# Patient Record
Sex: MALE | Race: WHITE | NOT HISPANIC OR LATINO | Employment: FULL TIME | URBAN - METROPOLITAN AREA
[De-identification: names, ages, dates, MRNs, and addresses within clinical notes are randomized per-mention and may not be internally consistent; named-entity substitution may affect disease eponyms.]

---

## 2022-09-01 ENCOUNTER — OFFICE VISIT (OUTPATIENT)
Dept: FAMILY MEDICINE CLINIC | Facility: CLINIC | Age: 27
End: 2022-09-01

## 2022-09-01 ENCOUNTER — HOSPITAL ENCOUNTER (OUTPATIENT)
Dept: GENERAL RADIOLOGY | Facility: HOSPITAL | Age: 27
Discharge: HOME | End: 2022-09-01
Admitting: NURSE PRACTITIONER

## 2022-09-01 VITALS
HEIGHT: 72 IN | TEMPERATURE: 96.8 F | WEIGHT: 279.9 LBS | SYSTOLIC BLOOD PRESSURE: 122 MMHG | DIASTOLIC BLOOD PRESSURE: 82 MMHG | OXYGEN SATURATION: 97 % | HEART RATE: 84 BPM | BODY MASS INDEX: 37.91 KG/M2 | RESPIRATION RATE: 18 BRPM

## 2022-09-01 DIAGNOSIS — R10.84 GENERALIZED ABDOMINAL PAIN: ICD-10-CM

## 2022-09-01 DIAGNOSIS — R10.84 GENERALIZED ABDOMINAL PAIN: Primary | ICD-10-CM

## 2022-09-01 DIAGNOSIS — K90.41 GLUTEN INTOLERANCE: ICD-10-CM

## 2022-09-01 PROCEDURE — 74019 RADEX ABDOMEN 2 VIEWS: CPT

## 2022-09-01 PROCEDURE — 90471 IMMUNIZATION ADMIN: CPT | Performed by: NURSE PRACTITIONER

## 2022-09-01 PROCEDURE — 90715 TDAP VACCINE 7 YRS/> IM: CPT | Performed by: NURSE PRACTITIONER

## 2022-09-01 PROCEDURE — 99203 OFFICE O/P NEW LOW 30 MIN: CPT | Performed by: NURSE PRACTITIONER

## 2022-09-01 RX ORDER — CETIRIZINE HYDROCHLORIDE 10 MG/1
10 TABLET ORAL DAILY
COMMUNITY

## 2022-09-01 NOTE — PROGRESS NOTES
"Chief Complaint  Establish Care and Abdominal Pain (Rt side, x 3 wks/Family history gall bladder)    Subjective        Venkatesh Brumfield presents to [facility]  History of Present Illness  HPI    Mr. Brumfield presents as a new patient with right side abdominal pain for the past 3 weeks.    The patient reports he is doing fine. He states he took an at home reaction test fir allergies. He notes he does not believe he has had a tetanus shot in the past 10 years. The patient reports for the last 2 or 3 weeks he has been experiencing pains in his lower stomach. He states he will sometimes get a \"dull sensation,\" and he would rate it a 0.5 out of 10. He notes his bowel movements have been regular. He reports he only gets cramps when he eats gluten. The patient states he had an episode that lasted for 4 hours and was a stabbing pain. He notes that was a 7 or 8 out of 10 on a scale. He reports he took tums and it did not help. He states seasonal allergy to POLLEN.He has a  family history of gallbladder disease.    Allergies  GLUTEN INTOLERANCE  BLACK TEA  CHICKEN ALLERGY  EGGS  RYE      Objective   Vital Signs:  /82 (BP Location: Left arm, Patient Position: Sitting, Cuff Size: Adult)   Pulse 84   Temp 96.8 °F (36 °C) (Temporal)   Resp 18   Ht 182.9 cm (72\")   Wt 127 kg (279 lb 14.4 oz)   SpO2 97%   BMI 37.96 kg/m²   Estimated body mass index is 37.96 kg/m² as calculated from the following:    Height as of this encounter: 182.9 cm (72\").    Weight as of this encounter: 127 kg (279 lb 14.4 oz).          Physical Exam  Vitals reviewed.   Constitutional:       General: He is not in acute distress.     Appearance: He is well-developed. He is not diaphoretic.   HENT:      Head: Normocephalic and atraumatic.      Right Ear: Tympanic membrane, ear canal and external ear normal.      Left Ear: Tympanic membrane, ear canal and external ear normal.      Nose: Nose normal.      " Mouth/Throat:      Pharynx: Uvula midline. No oropharyngeal exudate.   Cardiovascular:      Rate and Rhythm: Normal rate and regular rhythm.      Heart sounds: Normal heart sounds. No murmur heard.    No friction rub. No gallop.   Pulmonary:      Effort: Pulmonary effort is normal. No respiratory distress.      Breath sounds: Normal breath sounds. No wheezing or rales.   Abdominal:      General: Bowel sounds are normal. There is no distension.      Palpations: Abdomen is soft.      Tenderness: There is no abdominal tenderness.   Musculoskeletal:      Cervical back: Neck supple.   Skin:     General: Skin is warm and dry.   Neurological:      Mental Status: He is alert and oriented to person, place, and time.        Result Review :  DAXRESULTS                Assessment and Plan   Diagnoses and all orders for this visit:    1. Gluten intolerance (Primary)  -     Tissue Transglutaminase, IgA    2. Generalized abdominal pain  -     CBC & Differential  -     Comprehensive metabolic panel  -     XR abdomen flat and upright; Future    Other orders  -     Tdap Vaccine Greater Than or Equal To 6yo IM      DAXPLAN    Generalized abdominal pain   We will obtain abdominal flattened upright today. Suspect potential constipation. 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 We will go ahead and proceed with a CBC, CMP, and transglutaminase. He does have celiac intolerance. He did find food sensitivities recently with an at home test. This may be contributing to his belly pain. He has no tenderness in the right upper quadrant. He is concerned with family history of gallbladder disease. If it continues, we can evaluate need for ultrasound at that time. We are going to update his tetanus, Tdap today. He's up to date on his Covid vaccine. The labs as discussed and follow up as needed.         Follow Up   No follow-ups on file.  Patient was given instructions  and counseling regarding his condition or for health maintenance advice. Please see specific information pulled into the AVS if appropriate.       Transcribed from ambient dictation for CLINTON Abdi by Erica Bills.  09/01/22   14:42 EDT    Patient verbalized consent to the visit recording.  I have personally performed the services described in this document as transcribed by the above individual, and it is both accurate and complete.  CLINTON Abdi  9/6/2022  17:54 EDT

## 2022-09-02 LAB
ALBUMIN SERPL-MCNC: 5.2 G/DL (ref 3.5–5.2)
ALBUMIN/GLOB SERPL: 3.3 G/DL
ALP SERPL-CCNC: 84 U/L (ref 39–117)
ALT SERPL-CCNC: 21 U/L (ref 1–41)
AST SERPL-CCNC: 16 U/L (ref 1–40)
BASOPHILS # BLD AUTO: 0.03 10*3/MM3 (ref 0–0.2)
BASOPHILS NFR BLD AUTO: 0.6 % (ref 0–1.5)
BILIRUB SERPL-MCNC: 0.4 MG/DL (ref 0–1.2)
BUN SERPL-MCNC: 11 MG/DL (ref 6–20)
BUN/CREAT SERPL: 14.5 (ref 7–25)
CALCIUM SERPL-MCNC: 8.9 MG/DL (ref 8.6–10.5)
CHLORIDE SERPL-SCNC: 105 MMOL/L (ref 98–107)
CO2 SERPL-SCNC: 26 MMOL/L (ref 22–29)
CREAT SERPL-MCNC: 0.76 MG/DL (ref 0.76–1.27)
EGFRCR-CYS SERPLBLD CKD-EPI 2021: 124.8 ML/MIN/1.73
EOSINOPHIL # BLD AUTO: 0.06 10*3/MM3 (ref 0–0.4)
EOSINOPHIL NFR BLD AUTO: 1.2 % (ref 0.3–6.2)
ERYTHROCYTE [DISTWIDTH] IN BLOOD BY AUTOMATED COUNT: 12.3 % (ref 12.3–15.4)
GLOBULIN SER CALC-MCNC: 1.6 GM/DL
GLUCOSE SERPL-MCNC: 86 MG/DL (ref 65–99)
HCT VFR BLD AUTO: 44.5 % (ref 37.5–51)
HGB BLD-MCNC: 14.8 G/DL (ref 13–17.7)
IMM GRANULOCYTES # BLD AUTO: 0.02 10*3/MM3 (ref 0–0.05)
IMM GRANULOCYTES NFR BLD AUTO: 0.4 % (ref 0–0.5)
LYMPHOCYTES # BLD AUTO: 1.67 10*3/MM3 (ref 0.7–3.1)
LYMPHOCYTES NFR BLD AUTO: 33.1 % (ref 19.6–45.3)
MCH RBC QN AUTO: 29.6 PG (ref 26.6–33)
MCHC RBC AUTO-ENTMCNC: 33.3 G/DL (ref 31.5–35.7)
MCV RBC AUTO: 89 FL (ref 79–97)
MONOCYTES # BLD AUTO: 0.33 10*3/MM3 (ref 0.1–0.9)
MONOCYTES NFR BLD AUTO: 6.5 % (ref 5–12)
NEUTROPHILS # BLD AUTO: 2.93 10*3/MM3 (ref 1.7–7)
NEUTROPHILS NFR BLD AUTO: 58.2 % (ref 42.7–76)
NRBC BLD AUTO-RTO: 0 /100 WBC (ref 0–0.2)
PLATELET # BLD AUTO: 219 10*3/MM3 (ref 140–450)
POTASSIUM SERPL-SCNC: 4 MMOL/L (ref 3.5–5.2)
PROT SERPL-MCNC: 6.8 G/DL (ref 6–8.5)
RBC # BLD AUTO: 5 10*6/MM3 (ref 4.14–5.8)
SODIUM SERPL-SCNC: 142 MMOL/L (ref 136–145)
TTG IGA SER-ACNC: <2 U/ML (ref 0–3)
WBC # BLD AUTO: 5.04 10*3/MM3 (ref 3.4–10.8)

## 2022-09-23 ENCOUNTER — LAB (OUTPATIENT)
Dept: LAB | Facility: HOSPITAL | Age: 27
End: 2022-09-23

## 2022-09-23 ENCOUNTER — OFFICE VISIT (OUTPATIENT)
Dept: FAMILY MEDICINE CLINIC | Facility: CLINIC | Age: 27
End: 2022-09-23

## 2022-09-23 VITALS
HEART RATE: 83 BPM | OXYGEN SATURATION: 99 % | RESPIRATION RATE: 18 BRPM | SYSTOLIC BLOOD PRESSURE: 144 MMHG | WEIGHT: 278.1 LBS | TEMPERATURE: 96.9 F | DIASTOLIC BLOOD PRESSURE: 82 MMHG | BODY MASS INDEX: 37.67 KG/M2 | HEIGHT: 72 IN

## 2022-09-23 DIAGNOSIS — R10.31 RIGHT LOWER QUADRANT PAIN: Primary | ICD-10-CM

## 2022-09-23 DIAGNOSIS — R19.8 ABDOMINAL GUARDING: ICD-10-CM

## 2022-09-23 LAB
ALBUMIN SERPL-MCNC: 5.1 G/DL (ref 3.5–5.2)
ALBUMIN/GLOB SERPL: 2.1 G/DL
ALP SERPL-CCNC: 82 U/L (ref 39–117)
ALT SERPL W P-5'-P-CCNC: 23 U/L (ref 1–41)
ANION GAP SERPL CALCULATED.3IONS-SCNC: 11 MMOL/L (ref 5–15)
AST SERPL-CCNC: 18 U/L (ref 1–40)
BASOPHILS # BLD AUTO: 0.03 10*3/MM3 (ref 0–0.2)
BASOPHILS NFR BLD AUTO: 0.4 % (ref 0–1.5)
BILIRUB SERPL-MCNC: 0.4 MG/DL (ref 0–1.2)
BUN SERPL-MCNC: 11 MG/DL (ref 6–20)
BUN/CREAT SERPL: 12.2 (ref 7–25)
CALCIUM SPEC-SCNC: 9.7 MG/DL (ref 8.6–10.5)
CHLORIDE SERPL-SCNC: 103 MMOL/L (ref 98–107)
CO2 SERPL-SCNC: 29 MMOL/L (ref 22–29)
CREAT SERPL-MCNC: 0.9 MG/DL (ref 0.76–1.27)
DEPRECATED RDW RBC AUTO: 38 FL (ref 37–54)
EGFRCR SERPLBLD CKD-EPI 2021: 118.6 ML/MIN/1.73
EOSINOPHIL # BLD AUTO: 0.11 10*3/MM3 (ref 0–0.4)
EOSINOPHIL NFR BLD AUTO: 1.6 % (ref 0.3–6.2)
ERYTHROCYTE [DISTWIDTH] IN BLOOD BY AUTOMATED COUNT: 11.8 % (ref 12.3–15.4)
GLOBULIN UR ELPH-MCNC: 2.4 GM/DL
GLUCOSE SERPL-MCNC: 93 MG/DL (ref 65–99)
HCT VFR BLD AUTO: 44.6 % (ref 37.5–51)
HGB BLD-MCNC: 15.4 G/DL (ref 13–17.7)
IMM GRANULOCYTES # BLD AUTO: 0.01 10*3/MM3 (ref 0–0.05)
IMM GRANULOCYTES NFR BLD AUTO: 0.1 % (ref 0–0.5)
LYMPHOCYTES # BLD AUTO: 1.42 10*3/MM3 (ref 0.7–3.1)
LYMPHOCYTES NFR BLD AUTO: 21.3 % (ref 19.6–45.3)
MCH RBC QN AUTO: 30.6 PG (ref 26.6–33)
MCHC RBC AUTO-ENTMCNC: 34.5 G/DL (ref 31.5–35.7)
MCV RBC AUTO: 88.7 FL (ref 79–97)
MONOCYTES # BLD AUTO: 0.38 10*3/MM3 (ref 0.1–0.9)
MONOCYTES NFR BLD AUTO: 5.7 % (ref 5–12)
NEUTROPHILS NFR BLD AUTO: 4.73 10*3/MM3 (ref 1.7–7)
NEUTROPHILS NFR BLD AUTO: 70.9 % (ref 42.7–76)
NRBC BLD AUTO-RTO: 0 /100 WBC (ref 0–0.2)
PLATELET # BLD AUTO: 226 10*3/MM3 (ref 140–450)
PMV BLD AUTO: 10.4 FL (ref 6–12)
POTASSIUM SERPL-SCNC: 4.1 MMOL/L (ref 3.5–5.2)
PROT SERPL-MCNC: 7.5 G/DL (ref 6–8.5)
RBC # BLD AUTO: 5.03 10*6/MM3 (ref 4.14–5.8)
SODIUM SERPL-SCNC: 143 MMOL/L (ref 136–145)
WBC NRBC COR # BLD: 6.68 10*3/MM3 (ref 3.4–10.8)

## 2022-09-23 PROCEDURE — 80053 COMPREHEN METABOLIC PANEL: CPT | Performed by: NURSE PRACTITIONER

## 2022-09-23 PROCEDURE — 85025 COMPLETE CBC W/AUTO DIFF WBC: CPT | Performed by: NURSE PRACTITIONER

## 2022-09-23 PROCEDURE — 99214 OFFICE O/P EST MOD 30 MIN: CPT | Performed by: NURSE PRACTITIONER

## 2022-09-23 PROCEDURE — 36415 COLL VENOUS BLD VENIPUNCTURE: CPT | Performed by: NURSE PRACTITIONER

## 2022-09-23 NOTE — PROGRESS NOTES
"Chief Complaint  Abdominal Pain (X 5 days)    Subjective        Venkatesh Brumfield presents to [facility]  History of Present Illness  Acute right lower abdominal pain. Persistant. States improves if he sits down and relaxes. Missed two days of work this week. Pain with standing or sitting. Feels like a tightness in muscles. Denies fever or chills. Feels irritated related to pain, trouble sleeping. Pain is currently 1-2, started mild. Worse with movement. States when driving causes to tense. Denies urinary symptoms. Denies n/v/d/c.     Hx of gluten intolerance, states this is different.  Abdominal Pain        Objective   Vital Signs:  /82 (BP Location: Left arm, Patient Position: Sitting, Cuff Size: Adult)   Pulse 83   Temp 96.9 °F (36.1 °C) (Temporal)   Resp 18   Ht 182.9 cm (72.01\")   Wt 126 kg (278 lb 1.6 oz)   SpO2 99%   BMI 37.71 kg/m²   Estimated body mass index is 37.71 kg/m² as calculated from the following:    Height as of this encounter: 182.9 cm (72.01\").    Weight as of this encounter: 126 kg (278 lb 1.6 oz).          Physical Exam  Vitals reviewed.   Constitutional:       Appearance: Normal appearance.   Cardiovascular:      Rate and Rhythm: Normal rate and regular rhythm.      Heart sounds: No murmur heard.    No friction rub. No gallop.   Pulmonary:      Effort: Pulmonary effort is normal. No respiratory distress.      Breath sounds: Normal breath sounds. No wheezing, rhonchi or rales.   Abdominal:      General: Bowel sounds are normal. There is no distension.      Palpations: Abdomen is soft. There is no mass.      Tenderness: There is abdominal tenderness (generalized lower abdominal tenderness).      Hernia: No hernia is present.   Skin:     General: Skin is warm and dry.   Neurological:      Mental Status: He is alert and oriented to person, place, and time.   Psychiatric:         Mood and Affect: Mood normal.        Result Review :    CMP    CMP " 9/1/22 9/23/22 9/25/22   Glucose 86 93 73   BUN 11 11 11   Creatinine 0.76 0.90 0.92   Sodium 142 143 142   Potassium 4.0 4.1 3.2 (A)   Chloride 105 103 100   Calcium 8.9 9.7 9.7   Total Protein 6.8     Albumin 5.20 5.10 5.60 (A)   Globulin 1.6     Total Bilirubin 0.4 0.4 0.7   Alkaline Phosphatase 84 82 100   AST (SGOT) 16 18 23   ALT (SGPT) 21 23 31   (A) Abnormal value       Comments are available for some flowsheets but are not being displayed.           CBC    CBC 9/1/22 9/23/22 9/25/22   WBC 5.04 6.68 8.59   RBC 5.00 5.03 5.53   Hemoglobin 14.8 15.4 16.5   Hematocrit 44.5 44.6 47.6   MCV 89.0 88.7 86.1   MCH 29.6 30.6 29.8   MCHC 33.3 34.5 34.7   RDW 12.3 11.8 (A) 11.8 (A)   Platelets 219 226 221   (A) Abnormal value                      Assessment and Plan   Diagnoses and all orders for this visit:    1. Right lower quadrant pain (Primary)  -     CT Abdomen Pelvis With Contrast  -     CBC & Differential  -     Comprehensive metabolic panel    2. Abdominal guarding  -     CT Abdomen Pelvis With Contrast  -     CBC & Differential  -     Comprehensive metabolic panel    labs today to ensure labs are normal  Unable to get CT scan today, unable to obtain PA, advised patient for worsening symptoms to proceed to ER for further evaluation  His labs are stable which is reassuring, although does have hypokalemia, will need to increase potassium in diet         Follow Up   No follow-ups on file.  Patient was given instructions and counseling regarding his condition or for health maintenance advice. Please see specific information pulled into the AVS if appropriate.

## 2022-09-25 ENCOUNTER — HOSPITAL ENCOUNTER (EMERGENCY)
Facility: HOSPITAL | Age: 27
Discharge: HOME | End: 2022-09-25
Attending: EMERGENCY MEDICINE | Admitting: EMERGENCY MEDICINE

## 2022-09-25 ENCOUNTER — APPOINTMENT (OUTPATIENT)
Dept: CT IMAGING | Facility: HOSPITAL | Age: 27
End: 2022-09-25

## 2022-09-25 VITALS
OXYGEN SATURATION: 99 % | TEMPERATURE: 98.3 F | SYSTOLIC BLOOD PRESSURE: 139 MMHG | WEIGHT: 278 LBS | BODY MASS INDEX: 37.65 KG/M2 | HEART RATE: 64 BPM | HEIGHT: 72 IN | DIASTOLIC BLOOD PRESSURE: 102 MMHG | RESPIRATION RATE: 18 BRPM

## 2022-09-25 DIAGNOSIS — R10.31 RIGHT LOWER QUADRANT ABDOMINAL PAIN: Primary | ICD-10-CM

## 2022-09-25 DIAGNOSIS — E87.6 HYPOKALEMIA: ICD-10-CM

## 2022-09-25 DIAGNOSIS — K76.0 FATTY LIVER: ICD-10-CM

## 2022-09-25 LAB
ALBUMIN SERPL-MCNC: 5.6 G/DL (ref 3.5–5.2)
ALBUMIN/GLOB SERPL: 1.9 G/DL
ALP SERPL-CCNC: 100 U/L (ref 39–117)
ALT SERPL W P-5'-P-CCNC: 31 U/L (ref 1–41)
ANION GAP SERPL CALCULATED.3IONS-SCNC: 14.6 MMOL/L (ref 5–15)
AST SERPL-CCNC: 23 U/L (ref 1–40)
BASOPHILS # BLD AUTO: 0.04 10*3/MM3 (ref 0–0.2)
BASOPHILS NFR BLD AUTO: 0.5 % (ref 0–1.5)
BILIRUB SERPL-MCNC: 0.7 MG/DL (ref 0–1.2)
BILIRUB UR QL STRIP: NEGATIVE
BUN SERPL-MCNC: 11 MG/DL (ref 6–20)
BUN/CREAT SERPL: 12 (ref 7–25)
CALCIUM SPEC-SCNC: 9.7 MG/DL (ref 8.6–10.5)
CHLORIDE SERPL-SCNC: 100 MMOL/L (ref 98–107)
CLARITY UR: ABNORMAL
CO2 SERPL-SCNC: 27.4 MMOL/L (ref 22–29)
COLOR UR: YELLOW
CREAT SERPL-MCNC: 0.92 MG/DL (ref 0.76–1.27)
DEPRECATED RDW RBC AUTO: 37.7 FL (ref 37–54)
EGFRCR SERPLBLD CKD-EPI 2021: 115.5 ML/MIN/1.73
EOSINOPHIL # BLD AUTO: 0.05 10*3/MM3 (ref 0–0.4)
EOSINOPHIL NFR BLD AUTO: 0.6 % (ref 0.3–6.2)
ERYTHROCYTE [DISTWIDTH] IN BLOOD BY AUTOMATED COUNT: 11.8 % (ref 12.3–15.4)
GLOBULIN UR ELPH-MCNC: 3 GM/DL
GLUCOSE SERPL-MCNC: 73 MG/DL (ref 65–99)
GLUCOSE UR STRIP-MCNC: NEGATIVE MG/DL
HCT VFR BLD AUTO: 47.6 % (ref 37.5–51)
HGB BLD-MCNC: 16.5 G/DL (ref 13–17.7)
HGB UR QL STRIP.AUTO: NEGATIVE
IMM GRANULOCYTES # BLD AUTO: 0.02 10*3/MM3 (ref 0–0.05)
IMM GRANULOCYTES NFR BLD AUTO: 0.2 % (ref 0–0.5)
INR PPP: 1.1 (ref 0.9–1.1)
KETONES UR QL STRIP: ABNORMAL
LEUKOCYTE ESTERASE UR QL STRIP.AUTO: NEGATIVE
LIPASE SERPL-CCNC: 18 U/L (ref 13–60)
LYMPHOCYTES # BLD AUTO: 2.32 10*3/MM3 (ref 0.7–3.1)
LYMPHOCYTES NFR BLD AUTO: 27 % (ref 19.6–45.3)
MAGNESIUM SERPL-MCNC: 1.9 MG/DL (ref 1.6–2.6)
MCH RBC QN AUTO: 29.8 PG (ref 26.6–33)
MCHC RBC AUTO-ENTMCNC: 34.7 G/DL (ref 31.5–35.7)
MCV RBC AUTO: 86.1 FL (ref 79–97)
MONOCYTES # BLD AUTO: 0.46 10*3/MM3 (ref 0.1–0.9)
MONOCYTES NFR BLD AUTO: 5.4 % (ref 5–12)
NEUTROPHILS NFR BLD AUTO: 5.7 10*3/MM3 (ref 1.7–7)
NEUTROPHILS NFR BLD AUTO: 66.3 % (ref 42.7–76)
NITRITE UR QL STRIP: NEGATIVE
NRBC BLD AUTO-RTO: 0 /100 WBC (ref 0–0.2)
PH UR STRIP.AUTO: 6.5 [PH] (ref 5–8)
PLATELET # BLD AUTO: 221 10*3/MM3 (ref 140–450)
PMV BLD AUTO: 11.1 FL (ref 6–12)
POTASSIUM SERPL-SCNC: 3.2 MMOL/L (ref 3.5–5.2)
PROT SERPL-MCNC: 8.6 G/DL (ref 6–8.5)
PROT UR QL STRIP: NEGATIVE
PROTHROMBIN TIME: 14.1 SECONDS (ref 11.7–14.2)
RBC # BLD AUTO: 5.53 10*6/MM3 (ref 4.14–5.8)
SODIUM SERPL-SCNC: 142 MMOL/L (ref 136–145)
SP GR UR STRIP: 1.02 (ref 1–1.03)
UROBILINOGEN UR QL STRIP: ABNORMAL
WBC NRBC COR # BLD: 8.59 10*3/MM3 (ref 3.4–10.8)

## 2022-09-25 PROCEDURE — 25010000002 ONDANSETRON PER 1 MG: Performed by: EMERGENCY MEDICINE

## 2022-09-25 PROCEDURE — 80053 COMPREHEN METABOLIC PANEL: CPT | Performed by: EMERGENCY MEDICINE

## 2022-09-25 PROCEDURE — 83690 ASSAY OF LIPASE: CPT | Performed by: EMERGENCY MEDICINE

## 2022-09-25 PROCEDURE — 85025 COMPLETE CBC W/AUTO DIFF WBC: CPT | Performed by: EMERGENCY MEDICINE

## 2022-09-25 PROCEDURE — 83735 ASSAY OF MAGNESIUM: CPT | Performed by: EMERGENCY MEDICINE

## 2022-09-25 PROCEDURE — 96361 HYDRATE IV INFUSION ADD-ON: CPT

## 2022-09-25 PROCEDURE — 74177 CT ABD & PELVIS W/CONTRAST: CPT

## 2022-09-25 PROCEDURE — 85610 PROTHROMBIN TIME: CPT | Performed by: EMERGENCY MEDICINE

## 2022-09-25 PROCEDURE — 25010000002 IOPAMIDOL 61 % SOLUTION: Performed by: EMERGENCY MEDICINE

## 2022-09-25 PROCEDURE — 36415 COLL VENOUS BLD VENIPUNCTURE: CPT

## 2022-09-25 PROCEDURE — 99283 EMERGENCY DEPT VISIT LOW MDM: CPT

## 2022-09-25 PROCEDURE — 96374 THER/PROPH/DIAG INJ IV PUSH: CPT

## 2022-09-25 PROCEDURE — 81003 URINALYSIS AUTO W/O SCOPE: CPT | Performed by: EMERGENCY MEDICINE

## 2022-09-25 RX ORDER — SODIUM CHLORIDE 9 MG/ML
125 INJECTION, SOLUTION INTRAVENOUS CONTINUOUS
Status: DISCONTINUED | OUTPATIENT
Start: 2022-09-25 | End: 2022-09-25 | Stop reason: HOSPADM

## 2022-09-25 RX ORDER — ALUMINA, MAGNESIA, AND SIMETHICONE 2400; 2400; 240 MG/30ML; MG/30ML; MG/30ML
15 SUSPENSION ORAL ONCE
Status: COMPLETED | OUTPATIENT
Start: 2022-09-25 | End: 2022-09-25

## 2022-09-25 RX ORDER — ONDANSETRON 2 MG/ML
4 INJECTION INTRAMUSCULAR; INTRAVENOUS ONCE
Status: COMPLETED | OUTPATIENT
Start: 2022-09-25 | End: 2022-09-25

## 2022-09-25 RX ORDER — SODIUM CHLORIDE 0.9 % (FLUSH) 0.9 %
10 SYRINGE (ML) INJECTION AS NEEDED
Status: DISCONTINUED | OUTPATIENT
Start: 2022-09-25 | End: 2022-09-25 | Stop reason: HOSPADM

## 2022-09-25 RX ORDER — POTASSIUM CHLORIDE 750 MG/1
40 TABLET, FILM COATED, EXTENDED RELEASE ORAL ONCE
Status: COMPLETED | OUTPATIENT
Start: 2022-09-25 | End: 2022-09-25

## 2022-09-25 RX ORDER — ALUMINA, MAGNESIA, AND SIMETHICONE 2400; 2400; 240 MG/30ML; MG/30ML; MG/30ML
15 SUSPENSION ORAL ONCE
Status: DISCONTINUED | OUTPATIENT
Start: 2022-09-25 | End: 2022-09-25

## 2022-09-25 RX ADMIN — SODIUM CHLORIDE 125 ML/HR: 9 INJECTION, SOLUTION INTRAVENOUS at 18:09

## 2022-09-25 RX ADMIN — SODIUM CHLORIDE 1000 ML: 9 INJECTION, SOLUTION INTRAVENOUS at 17:02

## 2022-09-25 RX ADMIN — ALUMINUM HYDROXIDE, MAGNESIUM HYDROXIDE, AND DIMETHICONE 15 ML: 400; 400; 40 SUSPENSION ORAL at 18:26

## 2022-09-25 RX ADMIN — IOPAMIDOL 85 ML: 612 INJECTION, SOLUTION INTRAVENOUS at 18:49

## 2022-09-25 RX ADMIN — POTASSIUM CHLORIDE 40 MEQ: 750 TABLET, EXTENDED RELEASE ORAL at 19:46

## 2022-09-25 RX ADMIN — ONDANSETRON 4 MG: 2 INJECTION INTRAMUSCULAR; INTRAVENOUS at 18:26

## 2022-09-25 NOTE — ED PROVIDER NOTES
" EMERGENCY DEPARTMENT ENCOUNTER    Room Number:  40/40  Date of encounter:  9/25/2022  PCP: Carlie Claros APRN  Historian: Patient and mother      HPI:  Chief Complaint: \"I am having abdominal issues\"  A complete HPI/ROS/PMH/PSH/SH/FH are unobtainable due to: Not applicable  Context: Venkatesh Brumfield is a 27 y.o. male who presents to the ED c/o initially had some symptoms that started at the end of August and saw his primary care physician and had x-rays of the abdomen which were okay.  Had some improvement and then over the past week and a half started worsening of his abdominal issues.  He does not want to say that it started off his pain started off more as an uneasiness and may be a mild discomfort.  It then gradually became more of a painful sensation.  He describes the pain as a muscle tightness and sometimes sharp pain.  This again has been going on for about 1 week it is been constant but will wax and wane in severity.  Is located in his right lower quadrant.  He believes that it is worse after standing and walking, bending, twisting.  He is not eating or drinking as much but does not feel that the pain is worse with eating or drinking.  He had 1 episode of diarrhea last night.  It was a large amount of watery stool.  There was no blood in the diarrhea.  He has had bouts of nausea but he has not had any vomiting.  He has had no fevers or chills, chest pain, shortness of breath.  He has never had surgery on his abdomen.  He does have a history of gluten intolerance and history of episodic abdominal pain in the past.  He denies any dysuria or urinary frequency.  Denies any pain or swelling to his genitourinary system and no testicular pain or swelling.        Previous Episodes: Has had abdominal pain in the past.  This is more in the right lower quadrant and is not certain if this is exactly the same as what he is experienced in the past.  Current Symptoms: See above    MEDICAL HISTORY " REVIEWED  I can see that the patient was seen on September 23 at the office for right lower quadrant abdominal pain.  I also see that the patient was seen for abdominal pain and had x-rays on September 1, 2022.  On September 1 she was also seen by his primary care physician Dr. Claros.  I can see in looking at records over the past several years patient has had abdominal pain and there is mention of having chronic generalized abdominal pain.  Patient had x-rays on September 1 and I reviewed the report.  The x-rays were unremarkable and read by radiology.      PAST MEDICAL HISTORY  Active Ambulatory Problems     Diagnosis Date Noted   • No Active Ambulatory Problems     Resolved Ambulatory Problems     Diagnosis Date Noted   • No Resolved Ambulatory Problems     Past Medical History:   Diagnosis Date   • Ear infection          PAST SURGICAL HISTORY  Past Surgical History:   Procedure Laterality Date   • MANDIBLE SURGERY     • TYMPANOSTOMY           FAMILY HISTORY  Family History   Problem Relation Age of Onset   • Lumbar disc disease Mother    • Hypertension Mother    • Alcohol abuse Father    • Diabetes Father    • Esophageal varices Father    • Bipolar disorder Sister    • Diabetes Maternal Grandmother    • Diabetes Maternal Grandfather          SOCIAL HISTORY  Social History     Socioeconomic History   • Marital status: Single   Tobacco Use   • Smoking status: Never Smoker   • Smokeless tobacco: Never Used   Vaping Use   • Vaping Use: Every day   • Substances: CBD   Substance and Sexual Activity   • Alcohol use: Yes     Alcohol/week: 2.0 standard drinks     Types: 2 Shots of liquor per week     Comment: occasionally   • Drug use: Yes     Types: Marijuana     Comment: occ   • Sexual activity: Not Currently         ALLERGIES  Gluten meal, Black tea, Chicken allergy, Eggs or egg-derived products, and Rye        REVIEW OF SYSTEMS  Review of Systems     All systems reviewed and negative except for those discussed in  HPI.       PHYSICAL EXAM    I have reviewed the triage vital signs and nursing notes.    ED Triage Vitals [09/25/22 1550]   Temp Heart Rate Resp BP SpO2   98.3 °F (36.8 °C) 67 16 -- 98 %      Temp src Heart Rate Source Patient Position BP Location FiO2 (%)   Tympanic Monitor -- -- --       GENERAL: Male no acute distress.Vital signs on my initial evaluation unremarkable  HENT: nares patent  Head/neck/ face are symmetric without gross deformity, signs of trauma, or swelling  EYES: no scleral icterus, no conjunctival pallor.  NECK: Supple, no meningismus  CV: regular rhythm, regular rate with intact distal pulses.  RESPIRATORY: normal effort and no respiratory distress.  ABDOMEN: soft and obese.  Location of pain is focal in the right lower quadrant and suprapubic region.  Normal bowel sounds and there is no guarding.  Back exam is unremarkable and he does not have any CVA tenderness.  MUSCULOSKELETAL: no deformity.  Intact distal pulses to upper and lower extremities are equal strong and symmetric  NEURO: alert and appropriate, moves all extremities, follows commands.  No focal motor or sensory changes  SKIN: warm, dry    Vital signs and nursing notes reviewed.        LAB RESULTS  Recent Results (from the past 24 hour(s))   Comprehensive Metabolic Panel    Collection Time: 09/25/22  4:52 PM    Specimen: Blood   Result Value Ref Range    Glucose 73 65 - 99 mg/dL    BUN 11 6 - 20 mg/dL    Creatinine 0.92 0.76 - 1.27 mg/dL    Sodium 142 136 - 145 mmol/L    Potassium 3.2 (L) 3.5 - 5.2 mmol/L    Chloride 100 98 - 107 mmol/L    CO2 27.4 22.0 - 29.0 mmol/L    Calcium 9.7 8.6 - 10.5 mg/dL    Total Protein 8.6 (H) 6.0 - 8.5 g/dL    Albumin 5.60 (H) 3.50 - 5.20 g/dL    ALT (SGPT) 31 1 - 41 U/L    AST (SGOT) 23 1 - 40 U/L    Alkaline Phosphatase 100 39 - 117 U/L    Total Bilirubin 0.7 0.0 - 1.2 mg/dL    Globulin 3.0 gm/dL    A/G Ratio 1.9 g/dL    BUN/Creatinine Ratio 12.0 7.0 - 25.0    Anion Gap 14.6 5.0 - 15.0 mmol/L    eGFR  115.5 >60.0 mL/min/1.73   Protime-INR    Collection Time: 09/25/22  4:52 PM    Specimen: Blood   Result Value Ref Range    Protime 14.1 11.7 - 14.2 Seconds    INR 1.10 0.90 - 1.10   Lipase    Collection Time: 09/25/22  4:52 PM    Specimen: Blood   Result Value Ref Range    Lipase 18 13 - 60 U/L   Magnesium    Collection Time: 09/25/22  4:52 PM    Specimen: Blood   Result Value Ref Range    Magnesium 1.9 1.6 - 2.6 mg/dL   CBC Auto Differential    Collection Time: 09/25/22  4:52 PM    Specimen: Blood   Result Value Ref Range    WBC 8.59 3.40 - 10.80 10*3/mm3    RBC 5.53 4.14 - 5.80 10*6/mm3    Hemoglobin 16.5 13.0 - 17.7 g/dL    Hematocrit 47.6 37.5 - 51.0 %    MCV 86.1 79.0 - 97.0 fL    MCH 29.8 26.6 - 33.0 pg    MCHC 34.7 31.5 - 35.7 g/dL    RDW 11.8 (L) 12.3 - 15.4 %    RDW-SD 37.7 37.0 - 54.0 fl    MPV 11.1 6.0 - 12.0 fL    Platelets 221 140 - 450 10*3/mm3    Neutrophil % 66.3 42.7 - 76.0 %    Lymphocyte % 27.0 19.6 - 45.3 %    Monocyte % 5.4 5.0 - 12.0 %    Eosinophil % 0.6 0.3 - 6.2 %    Basophil % 0.5 0.0 - 1.5 %    Immature Grans % 0.2 0.0 - 0.5 %    Neutrophils, Absolute 5.70 1.70 - 7.00 10*3/mm3    Lymphocytes, Absolute 2.32 0.70 - 3.10 10*3/mm3    Monocytes, Absolute 0.46 0.10 - 0.90 10*3/mm3    Eosinophils, Absolute 0.05 0.00 - 0.40 10*3/mm3    Basophils, Absolute 0.04 0.00 - 0.20 10*3/mm3    Immature Grans, Absolute 0.02 0.00 - 0.05 10*3/mm3    nRBC 0.0 0.0 - 0.2 /100 WBC   Urinalysis With Microscopic If Indicated (No Culture) - Urine, Clean Catch    Collection Time: 09/25/22  5:00 PM    Specimen: Urine, Clean Catch   Result Value Ref Range    Color, UA Yellow Yellow, Straw    Appearance, UA Cloudy (A) Clear    pH, UA 6.5 5.0 - 8.0    Specific Gravity, UA 1.023 1.005 - 1.030    Glucose, UA Negative Negative    Ketones, UA Trace (A) Negative    Bilirubin, UA Negative Negative    Blood, UA Negative Negative    Protein, UA Negative Negative    Leuk Esterase, UA Negative Negative    Nitrite, UA Negative  Negative    Urobilinogen, UA 0.2 E.U./dL 0.2 - 1.0 E.U./dL       Ordered the above labs and independently reviewed the results.        RADIOLOGY  CT Abdomen Pelvis With Contrast    Result Date: 9/25/2022  CT ABDOMEN PELVIS W CONTRAST-  Radiation dose reduction techniques were utilized, including automated exposure control and exposure modulation based on body size.  Clinical: Nominal pain right lower quadrant  COMPARISON 12/6/2004  FINDINGS: Subtle diffuse decrease hepatic attenuation suggests fatty infiltration. No focal liver lesion, the gallbladder is normal, no biliary duct dilatation. The pancreas is satisfactory in appearance and the spleen is normal in size and shape. Both adrenal glands have a normal appearance. Both kidneys are normal. No calculus or obstructive uropathy. Ureters are normal in course and caliber to the urinary bladder. Prostate and seminal vesicles have a normal appearance.  The appendix, colon and small bowel are normal. No induration of the mesentery to suggest an inflammatory process. The stomach is collapsed, contour is within normal limits. No duodenal abnormality seen.  CONCLUSION: Hepatic attenuation suggests fatty infiltration. No acute intra-abdominal abnormality.   This report was finalized on 9/25/2022 7:23 PM by Dr. Gopal Mendez M.D.        I ordered the above noted radiological studies. Reviewed by me and discussed with radiologist.  See dictation for official radiology interpretation.      PROCEDURES    Procedures      MEDICATIONS GIVEN IN ER    Medications   sodium chloride 0.9 % bolus 1,000 mL (0 mL Intravenous Stopped 9/25/22 1955)   ondansetron (ZOFRAN) injection 4 mg (4 mg Intravenous Given 9/25/22 1826)   aluminum-magnesium hydroxide-simethicone (MAALOX MAX) 400-400-40 MG/5ML suspension 15 mL (15 mL Oral Given 9/25/22 1826)   iopamidol (ISOVUE-300) 61 % injection 100 mL (85 mL Intravenous Given 9/25/22 1849)   potassium chloride (K-DUR,KLOR-CON) ER tablet 40 mEq (40  mEq Oral Given 9/25/22 1946)         PROGRESS, DATA ANALYSIS, CONSULTS, AND MEDICAL DECISION MAKING    Differential diagnosis includes   - hepatobiliary pathology such as cholecystitis, cholangitis, and symptomatic cholelithiasis  -PUD  -Mesenteric ischemia  - Pancreatitis  - Dyspepsia  - Small bowel or large bowel obstruction  - Appendicitis  - Diverticulitis  - UTI including pyelonephritis  - Ureteral stone  - Zoster  - Colitis, including infectious and ischemic  - Atypical ACS  I informed the patient as well as the mother in the room of the test that we will order.  He states that he was supposed to get a CT scan of his abdomen pelvis but insurance did not improve the CT scan.  His symptoms are worsening so he decided to come here to the emergency department.  He does not want anything for pain at this time.  I explained to the patient and the mother the test that we will order and we will check a CT scan of his abdomen pelvis.  All questions answered.    We are currently under a pandemic from the COVID19 infection.  The patient presented to the emergency department by ambulance or personal vehicle. I followed the current protocols required by Infection Control at [facility] in my evaluation and treatment of the patient. The patient was wearing a face mask during my evaluation and throughout my encounter. During my whole encounter with this patient I used appropriate personal protective equipment.  This equipment consisted of eye protection, facemask, gown, and gloves.  I applied this equipment before entering the room.      All labs have been independently reviewed by me.  All radiology studies have been reviewed by me and discussed with radiologist dictating the report.   EKG's independently viewed and interpreted by me.  Discussion below represents my analysis of pertinent findings related to patient's condition, differential diagnosis, treatment plan and final disposition.      ED Course as of  09/25/22 2233   Sun Sep 25, 2022   1730 I had a long conversation with the patient as well as the mother in the room.  The patient does live with her mother.  Patient has a long history of depression and anxiety.  He has had very brief fleeting thoughts of suicide at times.  He has never acted on these thoughts and he never will act upon those thoughts.  He has no specific plan.  He has had similar episodes in the past.  He has a resource at work which can provide therapy and counseling for his depression and anxiety.  He smokes marijuana and only drinks alcohol occasionally.  He adamantly denies any suicidal ideation at this time.  He denies any plan to harm himself.  He has never attempted to harm himself in the past.  He does not want to be evaluated by therapist or access here in the emergency department.  The mother who is at bedside agrees with that plan.  She feels very comfortable with him going home and believes that he is not can at on these transient fleeting suicidal ideation.  She also does not want any further evaluation or therapy concerning his depression anxiety now with the emergency department.  He will sometimes get more depressed when he does have illness such as what he is dealing with with the nausea vomiting and abdominal pain.  I clarified this with the patient and verified this plan both the patient and the mother agree with this plan and do not want further evaluation here.  He is completely alert and oriented and appropriate.  He agrees if his symptoms of depression get worse or if he has any recurrent thoughts of wanting to harm himself he will reach out to his mother or potentially reach out to other people for help and return to the emergency department. [MM]   1927 I reviewed the CT scan of the abdomen pelvis from the radiologist report.  Patient has signs of fatty liver.  No other acute abnormality appreciated.  The appendix: Small bowel all appear normal.  Please see complete  dictated report from the radiologist. [MM]   1934 On reevaluation of the patient I informed him as well as his mother the results of the CT scan and lab work.  He has not seen a GI doctor.  I would refer him to a gastroenterologist.  He will follow-up with his nurse practitioner Harlan.  I gave him warning signs to return the emergency department if his pain gets worse, develops fever, not able tolerate liquids or solids, or any other concerns. [MM]   1935 I did also  him to drink some orange juice and citrus fruit as his potassium is mildly low. [MM]   1935 All questions answered. [MM]      ED Course User Index  [MM] Jonathan Rice MD       AS OF 22:33 EDT VITALS:    BP - (!) 139/102  HR - 64  TEMP - 98.3 °F (36.8 °C) (Tympanic)  02 SATS - 99%        DIAGNOSIS  Final diagnoses:   Right lower quadrant abdominal pain   Fatty liver   Hypokalemia         DISPOSITION  DISCHARGE    Patient discharged in stable condition.    Reviewed implications of results, diagnosis, meds, responsibility to follow up, warning signs and symptoms of possible worsening, potential complications and reasons to return to ER, including worsening of symptoms, worsening of pain, fever, not tolerating liquids or solids, or any concerns..    Patient/Family voiced understanding of above instructions.    Discussed plan for discharge, as there is no emergent indication for admission. Pt/family is agreeable and understands need for follow up and repeat testing.  Pt is aware that discharge does not mean that nothing is wrong but it indicates no emergency is present that requires admission and they must continue care with follow-up as given below or physician of their choice.     FOLLOW-UP  [facility]  [address] [place] [id number]  [facility] [id number] [phone]    Call tomorrow to arrange follow-up in the next 1 to 2 weeks.  Return if worsening of pain, fever, not able to tolerate liquids or solids,  or any concerns.         Medication List      No changes were made to your prescriptions during this visit.                 DICTATED UTILIZING DRAGON DICTATION    Note Disclaimer: At [facility], we believe that sharing information builds trust and better relationships. You are receiving this note because you recently visited [facility]. It is possible you will see health information before a provider has talked with you about it. This kind of information can be easy to misunderstand. To help you fully understand what it means for your health, we urge you to discuss this note with your provider.     Jonathan Rice MD  09/25/22 6521

## 2022-09-25 NOTE — ED TRIAGE NOTES
Pt complains of lower abdominal pain for the last week. Denies N/V/D with it. States that it has progressively gotten worse.     Patient masked at arrival and triage staff wore all appropriate PPE during entire encounter with patient.

## 2022-09-25 NOTE — DISCHARGE INSTRUCTIONS
As we discussed, take Tylenol and Motrin for pain.  Return if worsening of pain, fever, not able tolerate liquids or solids, or any other concerns.

## 2022-09-29 ENCOUNTER — OFFICE VISIT (OUTPATIENT)
Dept: GASTROENTEROLOGY | Facility: CLINIC | Age: 27
End: 2022-09-29

## 2022-09-29 VITALS
SYSTOLIC BLOOD PRESSURE: 139 MMHG | HEIGHT: 72 IN | DIASTOLIC BLOOD PRESSURE: 86 MMHG | TEMPERATURE: 97.3 F | WEIGHT: 281.2 LBS | HEART RATE: 78 BPM | BODY MASS INDEX: 38.09 KG/M2 | OXYGEN SATURATION: 97 %

## 2022-09-29 DIAGNOSIS — R10.30 LOWER ABDOMINAL PAIN: Primary | ICD-10-CM

## 2022-09-29 PROCEDURE — 99203 OFFICE O/P NEW LOW 30 MIN: CPT | Performed by: INTERNAL MEDICINE

## 2022-09-29 NOTE — PROGRESS NOTES
Chief Complaint   Patient presents with   • Abdominal Pain   • ED f/up   • Heartburn        Venkatesh Brumfield is a  27 y.o. male here for an initial visit for abdominal pain    HPI this 27-year-old white male patient of CLINTON Manjarrez presents after an ER visit 4 days ago because of an 1 week complaint of lower abdominal pain.  He has actually had pain symptoms intermittently for the past month and previous visits with x-ray and lab work obtained that were essentially negative.  Review of records indicates similar complaints of pain back in 2015 with an ultrasound performed at that time revealing steatosis otherwise negative.  CT scan showed fatty infiltration with all other parameters being normal.  The pain was described as a discomfort initially that progressed to sharp quality and became more constant although changing in intensity.  He notes especially when standing and when lying down the pain seems to be exacerbated.  It is not directly related to dietary intake.  His bowel pattern has slowed down some although he had 1 episode of diarrhea but no reported melena or bright red blood per rectum.  No significant weight changes have been noted.  We discussed further evaluation and I would offer colonoscopic examination given the chronicity of his symptoms.  We also talked about empiric treatment with antispasmodics but he wishes to defer that for the present.    Past Medical History:   Diagnosis Date   • Ear infection        Current Outpatient Medications   Medication Sig Dispense Refill   • cetirizine (zyrTEC) 10 MG tablet Take 10 mg by mouth Daily.       No current facility-administered medications for this visit.       PRN Meds:.    Allergies   Allergen Reactions   • Gluten Meal GI Intolerance   • Black Tea Unknown - Low Severity   • Chicken Allergy Unknown - Low Severity   • Eggs Or Egg-Derived Products Unknown - Low Severity   • Rye Unknown - Low Severity       Social History     Socioeconomic  History   • Marital status: Single   Tobacco Use   • Smoking status: Never Smoker   • Smokeless tobacco: Never Used   Vaping Use   • Vaping Use: Every day   • Substances: CBD   Substance and Sexual Activity   • Alcohol use: Yes     Alcohol/week: 2.0 standard drinks     Types: 2 Shots of liquor per week     Comment: occasionally   • Drug use: Yes     Types: Marijuana     Comment: occ   • Sexual activity: Not Currently       Family History   Problem Relation Age of Onset   • Colon polyps Mother    • Lumbar disc disease Mother    • Hypertension Mother    • Liver disease Father    • Alcohol abuse Father    • Diabetes Father    • Esophageal varices Father    • Bipolar disorder Sister    • Diabetes Maternal Uncle    • Delayed menopause Maternal Uncle    • Cancer Paternal Aunt    • Colon polyps Maternal Grandmother    • Diabetes Maternal Grandmother    • Heart disease Maternal Grandmother    • Diabetes Maternal Grandfather    • Alzheimer's disease Paternal Grandmother    • Cancer Paternal Grandmother    • Lung cancer Paternal Grandfather    • Colon cancer Maternal Great-Grandmother    • Uterine cancer Maternal Great-Grandmother    • Colon cancer Maternal Great-Grandfather    • Stroke Paternal Great-Grandmother        Review of Systems   Constitutional: Negative for activity change, appetite change, fatigue and unexpected weight change.   HENT: Negative for congestion, facial swelling, sore throat, trouble swallowing and voice change.    Eyes: Negative for photophobia and visual disturbance.   Respiratory: Negative for cough and choking.    Cardiovascular: Negative for chest pain.   Gastrointestinal: Positive for abdominal pain. Negative for abdominal distention, anal bleeding, blood in stool, constipation, diarrhea, nausea, rectal pain and vomiting.   Endocrine: Negative for polyphagia.   Musculoskeletal: Negative for arthralgias, gait problem and joint swelling.   Skin: Negative for color change, pallor and rash.    Allergic/Immunologic: Negative for food allergies.   Neurological: Negative for speech difficulty and headaches.   Hematological: Does not bruise/bleed easily.   Psychiatric/Behavioral: Negative for agitation, confusion and sleep disturbance.       Vitals:    09/29/22 1546   BP: 139/86   Pulse: 78   Temp: 97.3 °F (36.3 °C)   SpO2: 97%       Physical Exam  Vitals reviewed.   Constitutional:       General: He is not in acute distress.     Appearance: He is well-developed. He is not diaphoretic.   HENT:      Head: Normocephalic.      Mouth/Throat:      Pharynx: No oropharyngeal exudate.   Eyes:      General: No scleral icterus.     Conjunctiva/sclera: Conjunctivae normal.   Neck:      Thyroid: No thyromegaly.   Cardiovascular:      Rate and Rhythm: Normal rate and regular rhythm.      Heart sounds: No murmur heard.  Pulmonary:      Effort: No respiratory distress.      Breath sounds: Normal breath sounds. No wheezing or rales.   Abdominal:      General: Bowel sounds are normal. There is no distension.      Palpations: Abdomen is soft. There is no mass.      Tenderness: There is no abdominal tenderness.   Musculoskeletal:         General: No tenderness. Normal range of motion.      Cervical back: Normal range of motion.   Lymphadenopathy:      Cervical: No cervical adenopathy.   Skin:     General: Skin is warm and dry.      Findings: No erythema or rash.   Neurological:      Mental Status: He is alert and oriented to person, place, and time.   Psychiatric:         Behavior: Behavior normal.         ASSESSMENT  #1 abdominal pain: Chronic issue with no defined etiology.  May be IBS component, cannot rule out other etiology at this time      PLAN  Schedule colonoscopy, pending results may consider small bowel evaluation as well.      ICD-10-CM ICD-9-CM   1. Lower abdominal pain  R10.30 789.09

## 2022-10-12 ENCOUNTER — TELEPHONE (OUTPATIENT)
Dept: GASTROENTEROLOGY | Facility: CLINIC | Age: 27
End: 2022-10-12

## 2022-10-12 PROBLEM — R10.30 LOWER ABDOMINAL PAIN: Status: ACTIVE | Noted: 2022-10-12

## 2022-10-12 NOTE — TELEPHONE ENCOUNTER
RAFAEL patient via telephone for. Scheduled 1/20/23   with arrival time of 11:40 AM    . Pr  ep paperwork mailed to verified address on file. Patient advised arrival time may change based on [facility] guidelines. RAFAEL VALENZUELA

## 2023-01-20 ENCOUNTER — ANESTHESIA EVENT (OUTPATIENT)
Dept: GASTROENTEROLOGY | Facility: HOSPITAL | Age: 28
End: 2023-01-20
Payer: COMMERCIAL

## 2023-01-20 ENCOUNTER — ANESTHESIA (OUTPATIENT)
Dept: GASTROENTEROLOGY | Facility: HOSPITAL | Age: 28
End: 2023-01-20
Payer: COMMERCIAL

## 2023-01-20 ENCOUNTER — HOSPITAL ENCOUNTER (OUTPATIENT)
Facility: HOSPITAL | Age: 28
Setting detail: HOSPITAL OUTPATIENT SURGERY
Discharge: HOME | End: 2023-01-20
Attending: INTERNAL MEDICINE | Admitting: INTERNAL MEDICINE
Payer: COMMERCIAL

## 2023-01-20 VITALS
OXYGEN SATURATION: 98 % | WEIGHT: 278 LBS | HEART RATE: 64 BPM | SYSTOLIC BLOOD PRESSURE: 114 MMHG | RESPIRATION RATE: 18 BRPM | DIASTOLIC BLOOD PRESSURE: 83 MMHG | HEIGHT: 72 IN | BODY MASS INDEX: 37.65 KG/M2

## 2023-01-20 PROCEDURE — 45378 DIAGNOSTIC COLONOSCOPY: CPT | Performed by: INTERNAL MEDICINE

## 2023-01-20 PROCEDURE — 25010000002 PROPOFOL 10 MG/ML EMULSION: Performed by: NURSE ANESTHETIST, CERTIFIED REGISTERED

## 2023-01-20 PROCEDURE — S0260 H&P FOR SURGERY: HCPCS | Performed by: INTERNAL MEDICINE

## 2023-01-20 RX ORDER — PROPOFOL 10 MG/ML
VIAL (ML) INTRAVENOUS AS NEEDED
Status: DISCONTINUED | OUTPATIENT
Start: 2023-01-20 | End: 2023-01-20 | Stop reason: SURG

## 2023-01-20 RX ORDER — SODIUM CHLORIDE, SODIUM LACTATE, POTASSIUM CHLORIDE, CALCIUM CHLORIDE 600; 310; 30; 20 MG/100ML; MG/100ML; MG/100ML; MG/100ML
1000 INJECTION, SOLUTION INTRAVENOUS CONTINUOUS
Status: DISCONTINUED | OUTPATIENT
Start: 2023-01-20 | End: 2023-01-20 | Stop reason: HOSPADM

## 2023-01-20 RX ORDER — SODIUM CHLORIDE 0.9 % (FLUSH) 0.9 %
10 SYRINGE (ML) INJECTION AS NEEDED
Status: DISCONTINUED | OUTPATIENT
Start: 2023-01-20 | End: 2023-01-20 | Stop reason: HOSPADM

## 2023-01-20 RX ORDER — LIDOCAINE HYDROCHLORIDE 20 MG/ML
INJECTION, SOLUTION INFILTRATION; PERINEURAL AS NEEDED
Status: DISCONTINUED | OUTPATIENT
Start: 2023-01-20 | End: 2023-01-20 | Stop reason: SURG

## 2023-01-20 RX ADMIN — SODIUM CHLORIDE, POTASSIUM CHLORIDE, SODIUM LACTATE AND CALCIUM CHLORIDE: 600; 310; 30; 20 INJECTION, SOLUTION INTRAVENOUS at 13:10

## 2023-01-20 RX ADMIN — LIDOCAINE HYDROCHLORIDE 60 MG: 20 INJECTION, SOLUTION INFILTRATION; PERINEURAL at 13:14

## 2023-01-20 RX ADMIN — PROPOFOL 50 MG: 10 INJECTION, EMULSION INTRAVENOUS at 13:18

## 2023-01-20 RX ADMIN — PROPOFOL 300 MCG/KG/MIN: 10 INJECTION, EMULSION INTRAVENOUS at 13:14

## 2023-01-20 RX ADMIN — PROPOFOL 120 MG: 10 INJECTION, EMULSION INTRAVENOUS at 13:14

## 2023-01-20 RX ADMIN — SODIUM CHLORIDE, POTASSIUM CHLORIDE, SODIUM LACTATE AND CALCIUM CHLORIDE 1000 ML: 600; 310; 30; 20 INJECTION, SOLUTION INTRAVENOUS at 12:29

## 2023-01-20 NOTE — H&P
"[facility]  Pre Procedure History & Physical    Chief Complaint:   Abdominal    Subjective     HPI:   This 27-year-old male patient presents to the endoscopy suite for colonoscopic evaluation.  He has had a longstanding history of diffuse abdominal pain.  No prior colonoscopy of record    Past Medical History:   Past Medical History:   Diagnosis Date   • Abdominal pain    • Ear infection    • Fatty liver    • GERD (gastroesophageal reflux disease)        Past Surgical History:  Past Surgical History:   Procedure Laterality Date   • ADENOIDECTOMY Bilateral    • MANDIBLE SURGERY     • TYMPANOSTOMY         Family History:  Family History   Problem Relation Age of Onset   • Colon polyps Mother    • Lumbar disc disease Mother    • Hypertension Mother    • Liver disease Father    • Alcohol abuse Father    • Diabetes Father    • Esophageal varices Father    • Bipolar disorder Sister    • Diabetes Maternal Uncle    • Delayed menopause Maternal Uncle    • Cancer Paternal Aunt    • Colon polyps Maternal Grandmother    • Diabetes Maternal Grandmother    • Heart disease Maternal Grandmother    • Diabetes Maternal Grandfather    • Alzheimer's disease Paternal Grandmother    • Cancer Paternal Grandmother    • Lung cancer Paternal Grandfather    • Colon cancer Maternal Great-Grandmother    • Uterine cancer Maternal Great-Grandmother    • Colon cancer Maternal Great-Grandfather    • Stroke Paternal Great-Grandmother        Social History:   reports that he has never smoked. He has never used smokeless tobacco. He reports current alcohol use of about 2.0 standard drinks per week. He reports current drug use. Drug: Marijuana.    Medications:   No medications prior to admission.       Allergies:  Gluten meal, Black tea, Chicken allergy, Eggs or egg-derived products, and Rye    ROS:    Pertinent items are noted in HPI, all other systems reviewed and negative     Objective     Height 182.9 cm (72\"), weight 122 " kg (270 lb).    Physical Exam   Constitutional: Pt is oriented to person, place, and time and well-developed, well-nourished, and in no distress.   Mouth/Throat: Oropharynx is clear and moist.   Neck: Normal range of motion.   Cardiovascular: Normal rate, regular rhythm and normal heart sounds.    Pulmonary/Chest: Effort normal and breath sounds normal.   Abdominal: Soft. Nontender  Skin: Skin is warm and dry.   Psychiatric: Mood, memory, affect and judgment normal.     Assessment & Plan     Diagnosis:  Abdominal pain    Anticipated Surgical Procedure:  Colonoscopy    The risks, benefits, and alternatives of this procedure have been discussed with the patient or the responsible party- the patient understands and agrees to proceed.

## 2023-01-20 NOTE — ANESTHESIA POSTPROCEDURE EVALUATION
"Patient: Venkatesh Brumfield    Procedure Summary     Date: 01/20/23 Room / Location: [facility] /  [facility]    Anesthesia Start: 1309 Anesthesia Stop: 1341    Procedure: COLONOSCOPY TO CECUM AND TI Diagnosis:       Tortuous colon      Hemorrhoids      (Lower abdominal pain [R10.30])    Surgeons: Charles Dobbins MD Provider: Shayne Rosales MD    Anesthesia Type: MAC ASA Status: 3          Anesthesia Type: MAC    Vitals  Vitals Value Taken Time   /83 01/20/23 1401   Temp     Pulse 64 01/20/23 1401   Resp 18 01/20/23 1401   SpO2 98 % 01/20/23 1401           Post Anesthesia Care and Evaluation    Patient location during evaluation: bedside  Patient participation: complete - patient participated  Level of consciousness: sleepy but conscious  Pain score: 0  Pain management: adequate    Airway patency: patent  Anesthetic complications: No anesthetic complications    Cardiovascular status: acceptable  Respiratory status: acceptable  Hydration status: acceptable    Comments: /83 (BP Location: Left arm, Patient Position: Sitting)   Pulse 64   Resp 18   Ht 182.9 cm (72\")   Wt 126 kg (278 lb)   SpO2 98%   BMI 37.70 kg/m²         "

## 2023-01-20 NOTE — ANESTHESIA PREPROCEDURE EVALUATION
Anesthesia Evaluation     Patient summary reviewed and Nursing notes reviewed   NPO Solid Status: > 8 hours  NPO Liquid Status: > 4 hours           Airway   Mallampati: II  Neck ROM: full  No difficulty expected  Comment: Large beard  Dental - normal exam     Pulmonary     breath sounds clear to auscultation  Cardiovascular     Rhythm: regular        Neuro/Psych  GI/Hepatic/Renal/Endo    (+) obesity, morbid obesity, GERD,  liver disease,     Musculoskeletal     Abdominal   (+) obese,    Substance History   (+) drug use (marijuana)     OB/GYN          Other                        Anesthesia Plan    ASA 3     MAC     intravenous induction     Anesthetic plan, risks, benefits, and alternatives have been provided, discussed and informed consent has been obtained with: patient.        CODE STATUS:

## 2023-01-20 NOTE — DISCHARGE INSTRUCTIONS
For the next 24 hours patient needs to be with a responsible adult.    For 24 hours DO NOT drive, operate machinery, appliances, drink alcohol, make important decisions or sign legal documents.    Start with a light or bland diet if you are feeling sick to your stomach otherwise advance to regular diet as tolerated.    Follow recommendations on procedure report if provided by your doctor.    Call Dr Dobbins for problems [phone]    Problems may include but not limited to: large amounts of bleeding, trouble breathing, repeated vomiting, severe unrelieved pain, fever or chills.

## (undated) DEVICE — CANN O2 ETCO2 FITS ALL CONN CO2 SMPL A/ 7IN DISP LF

## (undated) DEVICE — ADAPT CLN BIOGUARD AIR/H2O DISP

## (undated) DEVICE — LN SMPL CO2 SHTRM SD STREAM W/M LUER

## (undated) DEVICE — KT ORCA ORCAPOD DISP STRL

## (undated) DEVICE — SENSR O2 OXIMAX FNGR A/ 18IN NONSTR

## (undated) DEVICE — TUBING, SUCTION, 1/4" X 10', STRAIGHT: Brand: MEDLINE